# Patient Record
Sex: MALE | Race: WHITE | Employment: OTHER | URBAN - METROPOLITAN AREA
[De-identification: names, ages, dates, MRNs, and addresses within clinical notes are randomized per-mention and may not be internally consistent; named-entity substitution may affect disease eponyms.]

---

## 2017-01-01 ENCOUNTER — HOME CARE VISIT (OUTPATIENT)
Dept: HOSPICE | Facility: HOSPICE | Age: 77
End: 2017-01-01
Payer: MEDICARE

## 2017-01-01 ENCOUNTER — HOME CARE VISIT (OUTPATIENT)
Dept: SCHEDULING | Facility: HOME HEALTH | Age: 77
End: 2017-01-01
Payer: MEDICARE

## 2017-01-01 ENCOUNTER — HOSPICE ADMISSION (OUTPATIENT)
Dept: HOSPICE | Facility: HOSPICE | Age: 77
End: 2017-01-01
Payer: MEDICARE

## 2017-01-01 ENCOUNTER — APPOINTMENT (OUTPATIENT)
Dept: GENERAL RADIOLOGY | Age: 77
End: 2017-01-01
Attending: EMERGENCY MEDICINE
Payer: MEDICARE

## 2017-01-01 ENCOUNTER — HOSPITAL ENCOUNTER (OUTPATIENT)
Age: 77
Setting detail: OBSERVATION
Discharge: HOSPICE | End: 2017-02-25
Attending: EMERGENCY MEDICINE | Admitting: INTERNAL MEDICINE
Payer: MEDICARE

## 2017-01-01 ENCOUNTER — APPOINTMENT (OUTPATIENT)
Dept: CT IMAGING | Age: 77
End: 2017-01-01
Attending: EMERGENCY MEDICINE
Payer: MEDICARE

## 2017-01-01 VITALS — SYSTOLIC BLOOD PRESSURE: 96 MMHG | HEART RATE: 68 BPM | RESPIRATION RATE: 19 BRPM | DIASTOLIC BLOOD PRESSURE: 63 MMHG

## 2017-01-01 VITALS
TEMPERATURE: 97.6 F | HEART RATE: 70 BPM | DIASTOLIC BLOOD PRESSURE: 82 MMHG | HEIGHT: 68 IN | RESPIRATION RATE: 12 BRPM | SYSTOLIC BLOOD PRESSURE: 107 MMHG

## 2017-01-01 VITALS — HEART RATE: 72 BPM | RESPIRATION RATE: 20 BRPM | SYSTOLIC BLOOD PRESSURE: 102 MMHG | DIASTOLIC BLOOD PRESSURE: 70 MMHG

## 2017-01-01 VITALS
RESPIRATION RATE: 22 BRPM | DIASTOLIC BLOOD PRESSURE: 60 MMHG | SYSTOLIC BLOOD PRESSURE: 80 MMHG | HEART RATE: 70 BPM | OXYGEN SATURATION: 97 %

## 2017-01-01 VITALS — DIASTOLIC BLOOD PRESSURE: 52 MMHG | SYSTOLIC BLOOD PRESSURE: 96 MMHG | RESPIRATION RATE: 18 BRPM | HEART RATE: 70 BPM

## 2017-01-01 VITALS
HEIGHT: 68 IN | WEIGHT: 151.2 LBS | SYSTOLIC BLOOD PRESSURE: 104 MMHG | OXYGEN SATURATION: 97 % | DIASTOLIC BLOOD PRESSURE: 65 MMHG | TEMPERATURE: 98 F | BODY MASS INDEX: 22.91 KG/M2 | HEART RATE: 69 BPM | RESPIRATION RATE: 18 BRPM

## 2017-01-01 VITALS
HEART RATE: 72 BPM | SYSTOLIC BLOOD PRESSURE: 90 MMHG | OXYGEN SATURATION: 97 % | TEMPERATURE: 98.1 F | RESPIRATION RATE: 20 BRPM | SYSTOLIC BLOOD PRESSURE: 114 MMHG | OXYGEN SATURATION: 98 % | HEART RATE: 70 BPM | DIASTOLIC BLOOD PRESSURE: 60 MMHG | DIASTOLIC BLOOD PRESSURE: 70 MMHG | RESPIRATION RATE: 20 BRPM

## 2017-01-01 DIAGNOSIS — I47.20 VENTRICULAR TACHYCARDIA: ICD-10-CM

## 2017-01-01 DIAGNOSIS — I50.22 CHRONIC SYSTOLIC HEART FAILURE (HCC): ICD-10-CM

## 2017-01-01 DIAGNOSIS — I50.9 ACUTE ON CHRONIC CONGESTIVE HEART FAILURE, UNSPECIFIED CONGESTIVE HEART FAILURE TYPE: ICD-10-CM

## 2017-01-01 DIAGNOSIS — S05.12XA CONTUSION OF GLOBE OF LEFT EYE, INITIAL ENCOUNTER: ICD-10-CM

## 2017-01-01 DIAGNOSIS — R55 SYNCOPE AND COLLAPSE: Primary | ICD-10-CM

## 2017-01-01 LAB
ALBUMIN SERPL BCP-MCNC: 3.2 G/DL (ref 3.2–4.6)
ALBUMIN/GLOB SERPL: 0.7 {RATIO} (ref 1.2–3.5)
ALP SERPL-CCNC: 61 U/L (ref 50–136)
ALT SERPL-CCNC: 41 U/L (ref 12–65)
ANION GAP BLD CALC-SCNC: 7 MMOL/L (ref 7–16)
ANION GAP BLD CALC-SCNC: 8 MMOL/L (ref 7–16)
AST SERPL W P-5'-P-CCNC: 70 U/L (ref 15–37)
ATRIAL RATE: 70 BPM
BASOPHILS # BLD AUTO: 0 K/UL (ref 0–0.2)
BASOPHILS # BLD: 0 % (ref 0–2)
BILIRUB SERPL-MCNC: 1.2 MG/DL (ref 0.2–1.1)
BNP SERPL-MCNC: 3719 PG/ML
BUN SERPL-MCNC: 34 MG/DL (ref 8–23)
BUN SERPL-MCNC: 37 MG/DL (ref 8–23)
CALCIUM SERPL-MCNC: 8.7 MG/DL (ref 8.3–10.4)
CALCIUM SERPL-MCNC: 9.2 MG/DL (ref 8.3–10.4)
CALCULATED P AXIS, ECG09: NORMAL DEGREES
CALCULATED R AXIS, ECG10: -116 DEGREES
CALCULATED T AXIS, ECG11: 79 DEGREES
CHLORIDE SERPL-SCNC: 103 MMOL/L (ref 98–107)
CHLORIDE SERPL-SCNC: 105 MMOL/L (ref 98–107)
CHOLEST SERPL-MCNC: 117 MG/DL
CK SERPL-CCNC: 104 U/L (ref 21–215)
CO2 SERPL-SCNC: 29 MMOL/L (ref 21–32)
CO2 SERPL-SCNC: 31 MMOL/L (ref 21–32)
CREAT SERPL-MCNC: 1.7 MG/DL (ref 0.8–1.5)
CREAT SERPL-MCNC: 1.74 MG/DL (ref 0.8–1.5)
DIAGNOSIS, 93000: NORMAL
DIASTOLIC BP, ECG02: NORMAL MMHG
DIFFERENTIAL METHOD BLD: ABNORMAL
EOSINOPHIL # BLD: 0 K/UL (ref 0–0.8)
EOSINOPHIL NFR BLD: 1 % (ref 0.5–7.8)
ERYTHROCYTE [DISTWIDTH] IN BLOOD BY AUTOMATED COUNT: 17.7 % (ref 11.9–14.6)
GLOBULIN SER CALC-MCNC: 4.6 G/DL (ref 2.3–3.5)
GLUCOSE SERPL-MCNC: 113 MG/DL (ref 65–100)
GLUCOSE SERPL-MCNC: 135 MG/DL (ref 65–100)
HCT VFR BLD AUTO: 38.8 % (ref 41.1–50.3)
HDLC SERPL-MCNC: 29 MG/DL (ref 40–60)
HDLC SERPL: 4 {RATIO}
HGB BLD-MCNC: 13 G/DL (ref 13.6–17.2)
IMM GRANULOCYTES # BLD: 0 K/UL (ref 0–0.5)
IMM GRANULOCYTES NFR BLD AUTO: 0.3 % (ref 0–5)
INR PPP: 2.6 (ref 0.9–1.2)
LDLC SERPL CALC-MCNC: 67.4 MG/DL
LIPID PROFILE,FLP: ABNORMAL
LYMPHOCYTES # BLD AUTO: 18 % (ref 13–44)
LYMPHOCYTES # BLD: 1.1 K/UL (ref 0.5–4.6)
MAGNESIUM SERPL-MCNC: 2.2 MG/DL (ref 1.8–2.4)
MAGNESIUM SERPL-MCNC: 2.3 MG/DL (ref 1.8–2.4)
MCH RBC QN AUTO: 33.4 PG (ref 26.1–32.9)
MCHC RBC AUTO-ENTMCNC: 33.5 G/DL (ref 31.4–35)
MCV RBC AUTO: 99.7 FL (ref 79.6–97.8)
MONOCYTES # BLD: 0.6 K/UL (ref 0.1–1.3)
MONOCYTES NFR BLD AUTO: 10 % (ref 4–12)
NEUTS SEG # BLD: 4.5 K/UL (ref 1.7–8.2)
NEUTS SEG NFR BLD AUTO: 71 % (ref 43–78)
P-R INTERVAL, ECG05: 192 MS
PLATELET # BLD AUTO: 182 K/UL (ref 150–450)
PMV BLD AUTO: 12.3 FL (ref 10.8–14.1)
POTASSIUM SERPL-SCNC: 3.5 MMOL/L (ref 3.5–5.1)
POTASSIUM SERPL-SCNC: 5.3 MMOL/L (ref 3.5–5.1)
PROT SERPL-MCNC: 7.8 G/DL (ref 6.3–8.2)
PROTHROMBIN TIME: 28.1 SEC (ref 9.6–12)
Q-T INTERVAL, ECG07: 478 MS
QRS DURATION, ECG06: 178 MS
QTC CALCULATION (BEZET), ECG08: 516 MS
RBC # BLD AUTO: 3.89 M/UL (ref 4.23–5.67)
SODIUM SERPL-SCNC: 141 MMOL/L (ref 136–145)
SODIUM SERPL-SCNC: 142 MMOL/L (ref 136–145)
SYSTOLIC BP, ECG01: NORMAL MMHG
TRIGL SERPL-MCNC: 103 MG/DL (ref 35–150)
TROPONIN I SERPL-MCNC: 0.05 NG/ML (ref 0.02–0.05)
TROPONIN I SERPL-MCNC: 0.05 NG/ML (ref 0.02–0.05)
TSH SERPL DL<=0.005 MIU/L-ACNC: 12.3 UIU/ML (ref 0.36–3.74)
VENTRICULAR RATE, ECG03: 70 BPM
VLDLC SERPL CALC-MCNC: 20.6 MG/DL (ref 6–23)
WBC # BLD AUTO: 6.3 K/UL (ref 4.3–11.1)

## 2017-01-01 PROCEDURE — 74011250637 HC RX REV CODE- 250/637: Performed by: INTERNAL MEDICINE

## 2017-01-01 PROCEDURE — G0299 HHS/HOSPICE OF RN EA 15 MIN: HCPCS

## 2017-01-01 PROCEDURE — 99218 HC RM OBSERVATION: CPT

## 2017-01-01 PROCEDURE — HOSPICE MEDICATION HC HH HOSPICE MEDICATION

## 2017-01-01 PROCEDURE — 0651 HSPC ROUTINE HOME CARE

## 2017-01-01 PROCEDURE — 99285 EMERGENCY DEPT VISIT HI MDM: CPT | Performed by: EMERGENCY MEDICINE

## 2017-01-01 PROCEDURE — 74011250636 HC RX REV CODE- 250/636: Performed by: EMERGENCY MEDICINE

## 2017-01-01 PROCEDURE — 80053 COMPREHEN METABOLIC PANEL: CPT | Performed by: EMERGENCY MEDICINE

## 2017-01-01 PROCEDURE — 96366 THER/PROPH/DIAG IV INF ADDON: CPT

## 2017-01-01 PROCEDURE — 84484 ASSAY OF TROPONIN QUANT: CPT | Performed by: NURSE PRACTITIONER

## 2017-01-01 PROCEDURE — 85610 PROTHROMBIN TIME: CPT | Performed by: EMERGENCY MEDICINE

## 2017-01-01 PROCEDURE — 99343 PR HOME VST NEW PATIENT MOD-HI SEVERITY 45 MINUTES: CPT | Performed by: INTERNAL MEDICINE

## 2017-01-01 PROCEDURE — 74011250636 HC RX REV CODE- 250/636: Performed by: NURSE PRACTITIONER

## 2017-01-01 PROCEDURE — 74011250637 HC RX REV CODE- 250/637: Performed by: NURSE PRACTITIONER

## 2017-01-01 PROCEDURE — 96374 THER/PROPH/DIAG INJ IV PUSH: CPT | Performed by: EMERGENCY MEDICINE

## 2017-01-01 PROCEDURE — 36415 COLL VENOUS BLD VENIPUNCTURE: CPT | Performed by: NURSE PRACTITIONER

## 2017-01-01 PROCEDURE — 96361 HYDRATE IV INFUSION ADD-ON: CPT | Performed by: EMERGENCY MEDICINE

## 2017-01-01 PROCEDURE — 80061 LIPID PANEL: CPT | Performed by: NURSE PRACTITIONER

## 2017-01-01 PROCEDURE — 85025 COMPLETE CBC W/AUTO DIFF WBC: CPT | Performed by: EMERGENCY MEDICINE

## 2017-01-01 PROCEDURE — 93005 ELECTROCARDIOGRAM TRACING: CPT | Performed by: EMERGENCY MEDICINE

## 2017-01-01 PROCEDURE — 96376 TX/PRO/DX INJ SAME DRUG ADON: CPT

## 2017-01-01 PROCEDURE — 96375 TX/PRO/DX INJ NEW DRUG ADDON: CPT

## 2017-01-01 PROCEDURE — 96375 TX/PRO/DX INJ NEW DRUG ADDON: CPT | Performed by: EMERGENCY MEDICINE

## 2017-01-01 PROCEDURE — 74011000258 HC RX REV CODE- 258: Performed by: NURSE PRACTITIONER

## 2017-01-01 PROCEDURE — 84443 ASSAY THYROID STIM HORMONE: CPT | Performed by: NURSE PRACTITIONER

## 2017-01-01 PROCEDURE — 82550 ASSAY OF CK (CPK): CPT | Performed by: NURSE PRACTITIONER

## 2017-01-01 PROCEDURE — 80048 BASIC METABOLIC PNL TOTAL CA: CPT | Performed by: NURSE PRACTITIONER

## 2017-01-01 PROCEDURE — 83880 ASSAY OF NATRIURETIC PEPTIDE: CPT | Performed by: EMERGENCY MEDICINE

## 2017-01-01 PROCEDURE — 3336500001 HSPC ELECTION

## 2017-01-01 PROCEDURE — G0155 HHCP-SVS OF CSW,EA 15 MIN: HCPCS

## 2017-01-01 PROCEDURE — 70450 CT HEAD/BRAIN W/O DYE: CPT

## 2017-01-01 PROCEDURE — 84484 ASSAY OF TROPONIN QUANT: CPT | Performed by: EMERGENCY MEDICINE

## 2017-01-01 PROCEDURE — 71020 XR CHEST PA LAT: CPT

## 2017-01-01 PROCEDURE — 96365 THER/PROPH/DIAG IV INF INIT: CPT

## 2017-01-01 PROCEDURE — 74011250636 HC RX REV CODE- 250/636: Performed by: INTERNAL MEDICINE

## 2017-01-01 PROCEDURE — 83735 ASSAY OF MAGNESIUM: CPT | Performed by: NURSE PRACTITIONER

## 2017-01-01 PROCEDURE — 83735 ASSAY OF MAGNESIUM: CPT | Performed by: EMERGENCY MEDICINE

## 2017-01-01 RX ORDER — FUROSEMIDE 20 MG/1
20 TABLET ORAL EVERY EVENING
COMMUNITY
End: 2017-01-01 | Stop reason: DRUGHIGH

## 2017-01-01 RX ORDER — FUROSEMIDE 10 MG/ML
40 INJECTION INTRAMUSCULAR; INTRAVENOUS
Status: COMPLETED | OUTPATIENT
Start: 2017-01-01 | End: 2017-01-01

## 2017-01-01 RX ORDER — AMIODARONE HYDROCHLORIDE 200 MG/1
200 TABLET ORAL 2 TIMES DAILY
Qty: 60 TAB | Refills: 1 | Status: SHIPPED | OUTPATIENT
Start: 2017-01-01 | End: 2017-01-01 | Stop reason: DRUGHIGH

## 2017-01-01 RX ORDER — ASPIRIN 81 MG/1
81 TABLET ORAL DAILY
Status: DISCONTINUED | OUTPATIENT
Start: 2017-01-01 | End: 2017-01-01 | Stop reason: HOSPADM

## 2017-01-01 RX ORDER — AMIODARONE HYDROCHLORIDE 200 MG/1
400 TABLET ORAL 2 TIMES DAILY
Status: DISCONTINUED | OUTPATIENT
Start: 2017-01-01 | End: 2017-01-01

## 2017-01-01 RX ORDER — LEVOTHYROXINE SODIUM 75 UG/1
75 TABLET ORAL
Status: DISCONTINUED | OUTPATIENT
Start: 2017-01-01 | End: 2017-01-01 | Stop reason: HOSPADM

## 2017-01-01 RX ORDER — CARVEDILOL 3.12 MG/1
3.12 TABLET ORAL 2 TIMES DAILY WITH MEALS
Status: DISCONTINUED | OUTPATIENT
Start: 2017-01-01 | End: 2017-01-01

## 2017-01-01 RX ORDER — AMIODARONE HYDROCHLORIDE 400 MG/1
400 TABLET ORAL 2 TIMES DAILY
Qty: 14 TAB | Refills: 0 | Status: SHIPPED | OUTPATIENT
Start: 2017-01-01 | End: 2017-01-01 | Stop reason: DRUGHIGH

## 2017-01-01 RX ORDER — LEVOTHYROXINE SODIUM 75 UG/1
75 TABLET ORAL
COMMUNITY

## 2017-01-01 RX ORDER — WARFARIN 2.5 MG/1
1.25 TABLET ORAL
Status: DISCONTINUED | OUTPATIENT
Start: 2017-01-01 | End: 2017-01-01 | Stop reason: HOSPADM

## 2017-01-01 RX ORDER — NITROGLYCERIN 0.4 MG/1
0.4 TABLET SUBLINGUAL
Status: DISCONTINUED | OUTPATIENT
Start: 2017-01-01 | End: 2017-01-01 | Stop reason: HOSPADM

## 2017-01-01 RX ORDER — FUROSEMIDE 10 MG/ML
20 INJECTION INTRAMUSCULAR; INTRAVENOUS ONCE
Status: COMPLETED | OUTPATIENT
Start: 2017-01-01 | End: 2017-01-01

## 2017-01-01 RX ORDER — SODIUM CHLORIDE 0.9 % (FLUSH) 0.9 %
5-10 SYRINGE (ML) INJECTION EVERY 8 HOURS
Status: DISCONTINUED | OUTPATIENT
Start: 2017-01-01 | End: 2017-01-01 | Stop reason: HOSPADM

## 2017-01-01 RX ORDER — LANOLIN ALCOHOL/MO/W.PET/CERES
400 CREAM (GRAM) TOPICAL DAILY
Status: DISCONTINUED | OUTPATIENT
Start: 2017-01-01 | End: 2017-01-01 | Stop reason: HOSPADM

## 2017-01-01 RX ORDER — FUROSEMIDE 20 MG/1
20 TABLET ORAL DAILY
Status: DISCONTINUED | OUTPATIENT
Start: 2017-01-01 | End: 2017-01-01 | Stop reason: HOSPADM

## 2017-01-01 RX ORDER — MORPHINE SULFATE 2 MG/ML
2 INJECTION, SOLUTION INTRAMUSCULAR; INTRAVENOUS
Status: DISCONTINUED | OUTPATIENT
Start: 2017-01-01 | End: 2017-01-01 | Stop reason: HOSPADM

## 2017-01-01 RX ORDER — SODIUM CHLORIDE 0.9 % (FLUSH) 0.9 %
5-10 SYRINGE (ML) INJECTION AS NEEDED
Status: DISCONTINUED | OUTPATIENT
Start: 2017-01-01 | End: 2017-01-01 | Stop reason: HOSPADM

## 2017-01-01 RX ORDER — AMIODARONE HYDROCHLORIDE 200 MG/1
400 TABLET ORAL 2 TIMES DAILY
Status: DISCONTINUED | OUTPATIENT
Start: 2017-01-01 | End: 2017-01-01 | Stop reason: HOSPADM

## 2017-01-01 RX ORDER — FENOFIBRATE 160 MG/1
160 TABLET ORAL DAILY
Status: DISCONTINUED | OUTPATIENT
Start: 2017-01-01 | End: 2017-01-01

## 2017-01-01 RX ORDER — ATORVASTATIN CALCIUM 40 MG/1
40 TABLET, FILM COATED ORAL DAILY
Status: DISCONTINUED | OUTPATIENT
Start: 2017-01-01 | End: 2017-01-01

## 2017-01-01 RX ORDER — WARFARIN 2.5 MG/1
2.5 TABLET ORAL
Status: DISCONTINUED | OUTPATIENT
Start: 2017-01-01 | End: 2017-01-01 | Stop reason: HOSPADM

## 2017-01-01 RX ORDER — ONDANSETRON 2 MG/ML
4 INJECTION INTRAMUSCULAR; INTRAVENOUS
Status: DISCONTINUED | OUTPATIENT
Start: 2017-01-01 | End: 2017-01-01 | Stop reason: HOSPADM

## 2017-01-01 RX ORDER — ACETAMINOPHEN 325 MG/1
650 TABLET ORAL
Status: DISCONTINUED | OUTPATIENT
Start: 2017-01-01 | End: 2017-01-01 | Stop reason: HOSPADM

## 2017-01-01 RX ADMIN — AMIODARONE HYDROCHLORIDE 400 MG: 200 TABLET ORAL at 17:21

## 2017-01-01 RX ADMIN — AMIODARONE HYDROCHLORIDE 400 MG: 200 TABLET ORAL at 09:12

## 2017-01-01 RX ADMIN — Medication 10 ML: at 14:00

## 2017-01-01 RX ADMIN — WARFARIN SODIUM 2.5 MG: 2.5 TABLET ORAL at 21:37

## 2017-01-01 RX ADMIN — AMIODARONE HYDROCHLORIDE 400 MG: 200 TABLET ORAL at 17:10

## 2017-01-01 RX ADMIN — LEVOTHYROXINE SODIUM 75 MCG: 75 TABLET ORAL at 05:19

## 2017-01-01 RX ADMIN — ONDANSETRON 4 MG: 2 INJECTION INTRAMUSCULAR; INTRAVENOUS at 00:12

## 2017-01-01 RX ADMIN — ACETAMINOPHEN 650 MG: 325 TABLET, FILM COATED ORAL at 03:17

## 2017-01-01 RX ADMIN — Medication 10 ML: at 05:07

## 2017-01-01 RX ADMIN — ATORVASTATIN CALCIUM 40 MG: 40 TABLET, FILM COATED ORAL at 09:06

## 2017-01-01 RX ADMIN — FUROSEMIDE 20 MG: 20 TABLET ORAL at 09:01

## 2017-01-01 RX ADMIN — Medication 10 ML: at 23:48

## 2017-01-01 RX ADMIN — FUROSEMIDE 40 MG: 10 INJECTION, SOLUTION INTRAMUSCULAR; INTRAVENOUS at 21:39

## 2017-01-01 RX ADMIN — FENOFIBRATE 160 MG: 160 TABLET ORAL at 09:31

## 2017-01-01 RX ADMIN — Medication 400 MG: at 09:02

## 2017-01-01 RX ADMIN — FENOFIBRATE 160 MG: 160 TABLET ORAL at 09:06

## 2017-01-01 RX ADMIN — Medication 5 ML: at 05:25

## 2017-01-01 RX ADMIN — LEVOTHYROXINE SODIUM 75 MCG: 75 TABLET ORAL at 05:06

## 2017-01-01 RX ADMIN — SODIUM CHLORIDE 500 ML: 900 INJECTION, SOLUTION INTRAVENOUS at 20:11

## 2017-01-01 RX ADMIN — Medication 400 MG: at 09:06

## 2017-01-01 RX ADMIN — AMIODARONE HYDROCHLORIDE 150 MG: 50 INJECTION, SOLUTION INTRAVENOUS at 23:07

## 2017-01-01 RX ADMIN — AMIODARONE HYDROCHLORIDE 400 MG: 200 TABLET ORAL at 09:02

## 2017-01-01 RX ADMIN — ASPIRIN 81 MG: 81 TABLET, COATED ORAL at 09:01

## 2017-01-01 RX ADMIN — AMIODARONE HYDROCHLORIDE 400 MG: 200 TABLET ORAL at 23:47

## 2017-01-01 RX ADMIN — ASPIRIN 81 MG: 81 TABLET, COATED ORAL at 09:07

## 2017-01-01 RX ADMIN — CARVEDILOL 3.12 MG: 3.12 TABLET, FILM COATED ORAL at 09:12

## 2017-01-01 RX ADMIN — ONDANSETRON 4 MG: 2 INJECTION INTRAMUSCULAR; INTRAVENOUS at 00:00

## 2017-01-01 RX ADMIN — AMIODARONE HYDROCHLORIDE 0.5 MG/MIN: 50 INJECTION, SOLUTION INTRAVENOUS at 12:03

## 2017-01-01 RX ADMIN — WARFARIN SODIUM 1.25 MG: 2.5 TABLET ORAL at 20:46

## 2017-01-01 RX ADMIN — FUROSEMIDE 20 MG: 10 INJECTION, SOLUTION INTRAMUSCULAR; INTRAVENOUS at 12:49

## 2017-01-01 RX ADMIN — AMIODARONE HYDROCHLORIDE 1 MG/MIN: 50 INJECTION, SOLUTION INTRAVENOUS at 23:16

## 2017-01-01 RX ADMIN — Medication 5 ML: at 05:06

## 2017-01-01 RX ADMIN — ASPIRIN 81 MG: 81 TABLET, COATED ORAL at 09:31

## 2017-01-01 RX ADMIN — Medication 10 ML: at 20:46

## 2017-01-01 RX ADMIN — Medication 10 ML: at 21:37

## 2017-01-01 RX ADMIN — ONDANSETRON 4 MG: 2 INJECTION INTRAMUSCULAR; INTRAVENOUS at 13:14

## 2017-01-01 RX ADMIN — ATORVASTATIN CALCIUM 40 MG: 40 TABLET, FILM COATED ORAL at 09:31

## 2017-01-01 RX ADMIN — ACETAMINOPHEN 650 MG: 325 TABLET, FILM COATED ORAL at 09:15

## 2017-01-01 RX ADMIN — Medication 400 MG: at 09:31

## 2017-01-30 PROBLEM — E03.9 ACQUIRED HYPOTHYROIDISM: Status: ACTIVE | Noted: 2017-01-01

## 2017-01-30 PROBLEM — E78.2 MIXED HYPERLIPIDEMIA: Status: ACTIVE | Noted: 2017-01-01

## 2017-02-22 NOTE — IP AVS SNAPSHOT
[facility] [place] 
 
 
 [address] [facility] [address] 
[phone] Patient: Neelima Childs MRN: [id number] You are allergic to the following Allergen Reactions Spironolactone Unknown (comments) Recent Documentation Height  
  
  
  
  
  
 1.727 m Emergency Contacts Name Discharge Info Relation Home Work Mobile Catie Cook  Child [2] [phone] About your hospitalization You were admitted on:  2017 You last received care in the:  [facility] 3 CLINICAL OBSERVATION You were discharged on:  2017 Unit phone number:  [phone] Why you were hospitalized Your primary diagnosis was:  Ventricular Tachycardia (Hcc) Your diagnoses also included:  Type 2 Diabetes Mellitus With Diabetic Chronic Kidney Disease (Hcc), Mixed Hyperlipidemia, Chronic Systolic Heart Failure (Hcc), Chronic Kidney Disease, Stage Iii (Moderate), Acquired Hypothyroidism Providers Seen During Your Hospitalizations Provider Role Specialty Primary office phone Bolivar Olvera MD Attending Provider Emergency Medicine [phone] Lamberto Hernandez MD Attending Provider Cardiology [phone] Your Primary Care Physician (PCP) Primary Care Physician Office Phone Office Fax Hao Ho [phone] Follow-up Information Follow up With Details Comments Contact Info Jany Rowell MD  as needed [address] [place] [address] 
[phone] Your Appointments   9:15 AM EST ANTICOAG with Malou Gonzalez PT [facility] ([address] [place] [address]) [address] 
[address] [place] [address] [place] [address]  
[phone] 2017 10:45 AM EDT Office Visit with Jany Rowell MD  
[address] [employer] ([address] [facility]) [address] [place] [address]  
[phone] Tuesday April 11, 2017  2:15 PM EDT Annual Wellness Exam with Gonzalo Araujo MD  
[facility] ([address]) [address]   
[phone] Current Discharge Medication List  
  
CONTINUE these medications which have CHANGED Dose & Instructions Dispensing Information Comments Morning Noon Evening Bedtime * amiodarone 400 mg tablet Commonly known as:  Lorren Lightcollins What changed:   
- medication strength 
- how much to take 
- how to take this - when to take this 
- additional instructions Your next dose is: Today, Tomorrow Other:  _________ Dose:  400 mg Take 1 Tab by mouth two (2) times a day for 7 days. Quantity:  14 Tab Refills:  0  
     
   
   
   
  
 * amiodarone 200 mg tablet Commonly known as:  CORDARONE What changed: You were already taking a medication with the same name, and this prescription was added. Make sure you understand how and when to take each. Your next dose is: Today, Tomorrow Other:  _________ Dose:  200 mg Take 1 Tab by mouth two (2) times a day. Quantity:  60 Tab Refills:  1  
     
   
   
   
  
 * Notice: This list has 2 medication(s) that are the same as other medications prescribed for you. Read the directions carefully, and ask your doctor or other care provider to review them with you. CONTINUE these medications which have NOT CHANGED Dose & Instructions Dispensing Information Comments Morning Noon Evening Bedtime  
 aspirin delayed-release 81 mg tablet Your next dose is: Today, Tomorrow Other:  _________ Take  by mouth daily. Refills:  0  
     
   
   
   
  
 BESIVANCE 0.6 % Drps ophthalmic suspension Generic drug:  besifloxacin Your next dose is: Today, Tomorrow Other:  _________ Dose:  1 Drop 1 Drop three (3) times daily. Refills:  0  
     
   
   
   
  
 COUMADIN 2.5 mg tablet Generic drug:  warfarin Your next dose is: Today, Tomorrow Other:  _________ Dose:  2.5 mg Take 2.5 mg by mouth daily. Refills:  0  
     
   
   
   
  
 LASIX 20 mg tablet Generic drug:  furosemide Your next dose is: Today, Tomorrow Other:  _________ Dose:  20 mg Take 20 mg by mouth every evening. Refills:  0  
     
   
   
   
  
 levothyroxine 75 mcg tablet Commonly known as:  SYNTHROID Your next dose is: Today, Tomorrow Other:  _________ Dose:  75 mcg Take 75 mcg by mouth Daily (before breakfast). Refills:  0  
     
   
   
   
  
 magnesium oxide 400 mg tablet Commonly known as:  MAG-OX Your next dose is: Today, Tomorrow Other:  _________ Dose:  400 mg Take 1 Tab by mouth daily. Quantity:  90 Tab Refills:  1 STOP taking these medications   
 atorvastatin 40 mg tablet Commonly known as:  LIPITOR  
   
  
 carvedilol 3.125 mg tablet Commonly known as:  COREG  
   
  
 digoxin 0.125 mg tablet Commonly known as:  LANOXIN  
   
  
 fenofibrate 160 mg tablet Commonly known as:  LOFIBRA  
   
  
 flaxseed oil 1,000 mg Cap Where to Get Your Medications Information on where to get these meds will be given to you by the nurse or doctor. ! Ask your nurse or doctor about these medications  
  amiodarone 200 mg tablet  
 amiodarone 400 mg tablet Discharge Instructions DISCHARGE SUMMARY from Nurse The following personal items are in your possession at time of discharge: 
 
Dental Appliances: Uppers, With patient, Lowers Visual Aid: Glasses, With patient Home Medications: None Jewelry: With patient, Watch Clothing: Footwear, Undergarments, With patient, Gibson Jonelle Other Valuables: Eyeglasses, With patient PATIENT INSTRUCTIONS: 
 
 
F-face looks uneven A-arms unable to move or move unevenly S-speech slurred or non-existent T-time-call 911 as soon as signs and symptoms begin-DO NOT go Back to bed or wait to see if you get better-TIME IS BRAIN. Warning Signs of HEART ATTACK Call 911 if you have these symptoms: 
? Chest discomfort. Most heart attacks involve discomfort in the center of the chest that lasts more than a few minutes, or that goes away and comes back. It can feel like uncomfortable pressure, squeezing, fullness, or pain. ? Discomfort in other areas of the upper body. Symptoms can include pain or discomfort in one or both arms, the back, neck, jaw, or stomach. ? Shortness of breath with or without chest discomfort. ? Other signs may include breaking out in a cold sweat, nausea, or lightheadedness. Don't wait more than five minutes to call [address]! Fast action can save your life. Calling 911 is almost always the fastest way to get lifesaving treatment. Emergency Medical Services staff can begin treatment when they arrive  up to an hour sooner than if someone gets to the hospital by car. The discharge information has been reviewed with the patient. The patient verbalized understanding. Discharge medications reviewed with the patient and appropriate educational materials and side effects teaching were provided. Heart Failure: Care Instructions Your Care Instructions Heart failure occurs when your heart does not pump as much blood as the body needs. Failure does not mean that the heart has stopped pumping but rather that it is not pumping as well as it should.  Over time, this causes fluid buildup in your lungs and other parts of your body. Fluid buildup can cause shortness of breath, fatigue, swollen ankles, and other problems. By taking medicines regularly, reducing sodium (salt) in your diet, checking your weight every day, and making lifestyle changes, you can feel better and live longer. Follow-up care is a key part of your treatment and safety. Be sure to make and go to all appointments, and call your doctor if you are having problems. It's also a good idea to know your test results and keep a list of the medicines you take. How can you care for yourself at home? Medicines · Be safe with medicines. Take your medicines exactly as prescribed. Call your doctor if you think you are having a problem with your medicine. · Do not take any vitamins, over-the-counter medicine, or herbal products without talking to your doctor first. Alisa Adams not take ibuprofen (Advil or Motrin) and naproxen (Aleve) without talking to your doctor first. They could make your heart failure worse. · You may be taking some of the following medicine. ¨ Beta-blockers can slow heart rate, decrease blood pressure, and improve your condition. Taking a beta-blocker may lower your chance of needing to be hospitalized. ¨ Angiotensin-converting enzyme inhibitors (ACEIs) reduce the heart's workload, lower blood pressure, and reduce swelling. Taking an ACEI may lower your chance of needing to be hospitalized again. ¨ Angiotensin II receptor blockers (ARBs) work like ACEIs. Your doctor may prescribe them instead of ACEIs. ¨ Diuretics, also called water pills, reduce swelling. ¨ Potassium supplements replace this important mineral, which is sometimes lost with diuretics. ¨ Aspirin and other blood thinners prevent blood clots, which can cause a stroke or heart attack. You will get more details on the specific medicines your doctor prescribes. Diet · Your doctor may suggest that you limit sodium to 2,000 milligrams (mg) a day or less. That is less than 1 teaspoon of salt a day, including all the salt you eat in cooking or in packaged foods. People get most of their sodium from processed foods. Fast food and restaurant meals also tend to be very high in sodium. · Ask your doctor how much liquid you can drink each day. You may have to limit liquids. Weight · Weigh yourself without clothing at the same time each day. Record your weight. Call your doctor if you gain more than 3 pounds in 2 to 3 days. A sudden weight gain may mean that your heart failure is getting worse. Activity level · Start light exercise (if your doctor says it is okay). Even if you can only do a small amount, exercise will help you get stronger, have more energy, and manage your weight and your stress. Walking is an easy way to get exercise. Start out by walking a little more than you did before. Bit by bit, increase the amount you walk. · When you exercise, watch for signs that your heart is working too hard. You are pushing yourself too hard if you cannot talk while you are exercising. If you become short of breath or dizzy or have chest pain, stop, sit down, and rest. 
· If you feel \"wiped out\" the day after you exercise, walk slower or for a shorter distance until you can work up to a better pace. · Get enough rest at night. Sleeping with 1 or 2 pillows under your upper body and head may help you breathe easier. Lifestyle changes · Do not smoke. Smoking can make a heart condition worse. If you need help quitting, talk to your doctor about stop-smoking programs and medicines. These can increase your chances of quitting for good. Quitting smoking may be the most important step you can take to protect your heart. · Limit alcohol to 2 drinks a day for men and 1 drink a day for women. Too much alcohol can cause health problems. · Avoid getting sick from colds and the flu.  Get a pneumococcal vaccine shot. If you have had one before, ask your doctor whether you need another dose. Get a flu shot each year. If you must be around people with colds or the flu, wash your hands often. When should you call for help? Call 911 if you have symptoms of sudden heart failure such as: 
· You have severe trouble breathing. · You cough up pink, foamy mucus. · You have a new irregular or rapid heartbeat. Call your doctor now or seek immediate medical care if: 
· You have new or increased shortness of breath. · You are dizzy or lightheaded, or you feel like you may faint. · You have sudden weight gain, such as 3 pounds or more in 2 to 3 days. · You have increased swelling in your legs, ankles, or feet. · You are suddenly so tired or weak that you cannot do your usual activities. Watch closely for changes in your health, and be sure to contact your doctor if: 
· You develop new symptoms. Where can you learn more? Go to http://nikolas-my.info/. Enter W508 in the search box to learn more about \"Heart Failure: Care Instructions. \" Current as of: January 27, 2016 Content Version: 11.1 © 2186-6949 [employer]. Care instructions adapted under license by [employer] (which disclaims liability or warranty for this information). If you have questions about a medical condition or this instruction, always ask your healthcare professional. [place] [address] any warranty or liability for your use of this information. Discharge Orders None [facility] Announcement We are excited to announce that we are making your provider's discharge notes available to you in P[employer]. You will see these notes when they are completed and signed by the physician that discharged you from your recent hospital stay.   If you have any questions or concerns about any information you see in PingTank, please call the [employer] Department where you were seen or reach out to your Primary Care Provider for more information about your plan of care. Introducing [facility]! Dear Lorraine Monroy: Thank you for requesting a [employer] account. Our records indicate that you already have an active [employer] account. You can access your account anytime at [email or web address] Did you know that you can access your hospital and ER discharge instructions at any time in [employer]? You can also review all of your test results from your hospital stay or ER visit. Additional Information If you have questions, please visit the Frequently Asked Questions section of the [employer] website at [email or web address] Remember, [employer] is NOT to be used for urgent needs. For medical emergencies, dial 911. Now available from your iPhone and Android! General Information Please provide this summary of care documentation to your next provider. Patient Signature:  ____________________________________________________________ Date:  ____________________________________________________________  
  
Gabriela Hendrickson Provider Signature:  ____________________________________________________________ Date:  ____________________________________________________________

## 2017-02-23 NOTE — PROGRESS NOTES
Skin assessment completed with secondary RN. Patient has scab/abrasion on right side of neck. Pt unaware of how he got this. Left eye swollen and ecchymotic from recent fall at home. Middle of forehead has swollen bump that is ecchymotic from fall. Bridge of nose is scabbed. BUE alina with scattered abrasions and ecchymosis. Sacrum and heels visualized and intact. No breakdown noted.

## 2017-02-23 NOTE — PROGRESS NOTES
Bedside and Verbal shift change report given to Shanelle Monterroso RN (oncoming nurse) by self Monserrat Bunch nurse). Report included the following information SBAR, Kardex, MAR and Recent Results.

## 2017-02-23 NOTE — HOSPICE
Met with daughter and patient in room to discuss hospice services, levels of care, philosophy, insurance and type of care. Patient and daughter with many questions. Patient teary and stated \"does this mean I have less than 6 months and I just need to give up, someone came in and told me to pick a date to have my pacer turned off, will this be the day I die? \" Comfort measures, quality of life and change in direction of care and what we hope for explained. Discussed what care would look like in the home. Daughter and patient with appropriate questions. Asked for card and brochures.  Thank you for this referral.  CARLITO Moyer raysa Nurse Liaison  [facility]  [phone]

## 2017-02-23 NOTE — ED NOTES
TRANSFER - OUT REPORT:    Verbal report given to CARLITO Valle(name) on Carmen Grider  being transferred to [facility](unit) for routine progression of care       Report consisted of patients Situation, Background, Assessment and   Recommendations(SBAR). Information from the following report(s) SBAR, ED Summary, [facility] and Recent Results was reviewed with the receiving nurse. Lines:   Peripheral IV 02/22/17 Right Arm (Active)        Opportunity for questions and clarification was provided.       Patient transported with:   Monitor  Registered Nurse

## 2017-02-23 NOTE — PROGRESS NOTES
TRANSFER - IN REPORT:    Verbal report received from Gumaro Cho RN (name) on Hernandez Blas  being received from ED (unit) for routine progression of care      Report consisted of patients Situation, Background, Assessment and   Recommendations(SBAR). Information from the following report(s) SBAR, Kardex and ED Summary was reviewed with the receiving nurse. Opportunity for questions and clarification was provided. Assessment completed upon patients arrival to unit and care assumed. Telemetry monitor applied. VS taken. Red gripper socks applied. Pt denies any pain or SOB. Bed low and locked. Side rails x 2. Call light within reach. Pt verbalizes understanding to call for assistance.

## 2017-02-23 NOTE — PROGRESS NOTES
[facility] CARDIOLOGY PROGRESS NOTE           2/23/2017 12:36 PM    Admit Date: 2/22/2017    Admit Diagnosis: Ventricular tachycardia (HCC)      Subjective:   No complaints this AM, no chest pain or shortness of breath    Interval History: (History of pertinent interval events obtained from nursing staff)  Palliative care consult came by this AM    ROS:  GEN:  No fever or chills  Cardiovascular:  As noted above  Pulmonary:  As noted above  Neuro:  No new focal motor or sensory loss      Objective:     Vitals:    02/23/17 0421 02/23/17 0630 02/23/17 0844 02/23/17 0909   BP:  91/52 (!) 84/51 100/57   Pulse:  72 70    Resp:  18 17    Temp:  97.4 °F (36.3 °C) 97.4 °F (36.3 °C)    SpO2:  97% 98%    Weight: 67.6 kg (149 lb)      Height:           Physical Exam:  General-chronically ill appearing  Neck- supple, no JVD  CV- regular rate, paced, distant S1, S2, II/VI SM  Lung- clear bilaterally  Abd- soft, nontender, nondistended  Ext- no edema bilaterally.   Skin- warm and dry    Current Facility-Administered Medications   Medication Dose Route Frequency    amiodarone (CORDARONE) tablet 400 mg  400 mg Oral BID    aspirin delayed-release tablet 81 mg  81 mg Oral DAILY    atorvastatin (LIPITOR) tablet 40 mg  40 mg Oral DAILY    carvedilol (COREG) tablet 3.125 mg  3.125 mg Oral BID WITH MEALS    fenofibrate (LOFIBRA) tablet 160 mg  160 mg Oral DAILY    levothyroxine (SYNTHROID) tablet 75 mcg  75 mcg Oral ACB    warfarin (COUMADIN) tablet 2.5 mg  2.5 mg Oral Once per day on Sun Tue Thu Sat    magnesium oxide (MAG-OX) tablet 400 mg  400 mg Oral DAILY    sodium chloride (NS) flush 5-10 mL  5-10 mL IntraVENous Q8H    sodium chloride (NS) flush 5-10 mL  5-10 mL IntraVENous PRN    nitroglycerin (NITROSTAT) tablet 0.4 mg  0.4 mg SubLINGual Q5MIN PRN    morphine injection 2 mg  2 mg IntraVENous Q4H PRN    acetaminophen (TYLENOL) tablet 650 mg  650 mg Oral Q4H PRN    ondansetron (ZOFRAN) injection 4 mg 4 mg IntraVENous Q4H PRN    [START ON 2/24/2017] warfarin (COUMADIN) tablet 1.25 mg  1.25 mg Oral Once per day on Mon Wed Fri     Data Review:   Recent Results (from the past 24 hour(s))   EKG, 12 LEAD, INITIAL    Collection Time: 02/22/17  5:56 PM   Result Value Ref Range    Systolic BP  mmHg    Diastolic BP  mmHg    Ventricular Rate 70 BPM    Atrial Rate 70 BPM    P-R Interval 192 ms    QRS Duration 178 ms    Q-T Interval 478 ms    QTC Calculation (Bezet) 516 ms    Calculated P Axis  degrees    Calculated R Axis -116 degrees    Calculated T Axis 79 degrees    Diagnosis       AV sequential or dual chamber electronic pacemaker  Confirmed by Haider Dunn ([id number]) on 2/23/2017 7:02:15 AM     CBC WITH AUTOMATED DIFF    Collection Time: 02/22/17  6:05 PM   Result Value Ref Range    WBC 6.3 4.3 - 11.1 K/uL    RBC 3.89 (L) 4.23 - 5.67 M/uL    HGB 13.0 (L) 13.6 - 17.2 g/dL    HCT 38.8 (L) 41.1 - 50.3 %    MCV 99.7 (H) 79.6 - 97.8 FL    MCH 33.4 (H) 26.1 - 32.9 PG    MCHC 33.5 31.4 - 35.0 g/dL    RDW 17.7 (H) 11.9 - 14.6 %    PLATELET 727 901 - 799 K/uL    MPV 12.3 10.8 - 14.1 FL    DF AUTOMATED      NEUTROPHILS 71 43 - 78 %    LYMPHOCYTES 18 13 - 44 %    MONOCYTES 10 4.0 - 12.0 %    EOSINOPHILS 1 0.5 - 7.8 %    BASOPHILS 0 0.0 - 2.0 %    IMMATURE GRANULOCYTES 0.3 0.0 - 5.0 %    ABS. NEUTROPHILS 4.5 1.7 - 8.2 K/UL    ABS. LYMPHOCYTES 1.1 0.5 - 4.6 K/UL    ABS. MONOCYTES 0.6 0.1 - 1.3 K/UL    ABS. EOSINOPHILS 0.0 0.0 - 0.8 K/UL    ABS. BASOPHILS 0.0 0.0 - 0.2 K/UL    ABS. IMM.  GRANS. 0.0 0.0 - 0.5 K/UL   METABOLIC PANEL, COMPREHENSIVE    Collection Time: 02/22/17  6:05 PM   Result Value Ref Range    Sodium 142 136 - 145 mmol/L    Potassium 3.5 3.5 - 5.1 mmol/L    Chloride 103 98 - 107 mmol/L    CO2 31 21 - 32 mmol/L    Anion gap 8 7 - 16 mmol/L    Glucose 113 (H) 65 - 100 mg/dL    BUN 34 (H) 8 - 23 MG/DL    Creatinine 1.70 (H) 0.8 - 1.5 MG/DL    GFR est AA 51 (L) >60 ml/min/1.73m2    GFR est non-AA 42 (L) >60 ml/min/1.73m2 Calcium 9.2 8.3 - 10.4 MG/DL    Bilirubin, total 1.2 (H) 0.2 - 1.1 MG/DL    ALT (SGPT) 41 12 - 65 U/L    AST (SGOT) 70 (H) 15 - 37 U/L    Alk. phosphatase 61 50 - 136 U/L    Protein, total 7.8 6.3 - 8.2 g/dL    Albumin 3.2 3.2 - 4.6 g/dL    Globulin 4.6 (H) 2.3 - 3.5 g/dL    A-G Ratio 0.7 (L) 1.2 - 3.5     MAGNESIUM    Collection Time: 02/22/17  6:05 PM   Result Value Ref Range    Magnesium 2.2 1.8 - 2.4 mg/dL   TROPONIN I    Collection Time: 02/22/17  6:05 PM   Result Value Ref Range    Troponin-I, Qt. 0.05 0.02 - 0.05 NG/ML   BNP    Collection Time: 02/22/17  6:43 PM   Result Value Ref Range    BNP 3719 pg/mL   TSH 3RD GENERATION    Collection Time: 02/22/17  6:43 PM   Result Value Ref Range    TSH 12.300 (H) 0.358 - 3.740 uIU/mL   PROTHROMBIN TIME + INR    Collection Time: 02/22/17  8:08 PM   Result Value Ref Range    Prothrombin time 28.1 (H) 9.6 - 12.0 sec    INR 2.6 (H) 0.9 - 1.2     CK    Collection Time: 02/23/17 12:21 AM   Result Value Ref Range     21 - 215 U/L   TROPONIN I    Collection Time: 02/23/17 12:21 AM   Result Value Ref Range    Troponin-I, Qt. 0.05 0.02 - 0.05 NG/ML   LIPID PANEL    Collection Time: 02/23/17  4:30 AM   Result Value Ref Range    LIPID PROFILE          Cholesterol, total 117 <200 MG/DL    Triglyceride 103 35 - 150 MG/DL    HDL Cholesterol 29 (L) 40 - 60 MG/DL    LDL, calculated 67.4 <100 MG/DL    VLDL, calculated 20.6 6.0 - 23.0 MG/DL    CHOL/HDL Ratio 4.0         EKG:  (EKG has been independently visualized by me with interpretation below)  Assessment:     Principal Problem:    Ventricular tachycardia ([address] (3/7/2016)      Overview: Amiodarone.           Active Problems:    Type 2 diabetes mellitus with diabetic chronic kidney disease ([facility] (6/12/2011)      Chronic kidney disease, stage III (moderate) (6/12/2011)      Overview: Baseline Cr 1.6      Chronic systolic heart failure ([facility] (6/13/2011)      Overview: Ischemic cardiomyopathy, EF 22% by MUGA 10/08, had repeatedly declined       device therapy, but agreed in Jan 2013 Remote CABG in [place]. Intolerant of aldosterone blockade due to hyperkalemia. Unable to titrate       beta blocker and ACEi due to hypotension. S ER 12/15/12: IV lasix for dyspnea with CHF exacerbation, BNP 3030, CXR       remarkably clear. 1/3/13: Severe 4 chamber dilatation, EF 10-15%, moderate to severe MR,       moderate TR, RVSP 48, probable LV thrombus (anticoagulated)      Jan 2013: Severe LV Dilatation, EF 10-15%, restrictivve diastolic       fillling, RVSP 48      Sep 2013: Echo - severely dilated LV. LVEF 10-15%, mild MR and TR, RVSP 25      Sep 2014: Echo - LVEF estimated 5-10% (calculated 22% felt to be       optimistic), moderate MR and TR, RVSP 32, thinning and akinesis of the       inferior wall and apex      Oct 2015: EF 13%, mild MR and TR, RVSP 27      Jan 2017 - severe LVE, EF 10-15%, only the mid anterior wall moves, mild       to mod MR, RVSP 40      Acquired hypothyroidism (1/30/2017)      Mixed hyperlipidemia (1/30/2017)      Plan:   1. VT: increased amiodarone 400mg Q12H, decreased VT-1 zone to incorporate slower VT  2. End stage CHF: palliative care consult placed, discussed goals of care with patient today and daughter, his main concern is he wants to go home with home oxygen (apparently discussed with him by palliative care team), cont current meds  3. CKD: stable, around his baseline  4. DNR    Criss Aguero MD  Cardiology/Electrophysiology

## 2017-02-23 NOTE — PROGRESS NOTES
Bedside shift change report received from [facility]. Report included the following information SBAR, Kardex, MAR and Recent Results.

## 2017-02-23 NOTE — ED PROVIDER NOTES
HPI Comments: Patient is a 69-year-old male who is coming in after having to be episode. He was lying in bed and felt dizzy raise. In the next thing he knew he was on the floor. He does not clearly remember the episode. He did hit the left side of his head. And he does take Coumadin. He currently states he feels good except for mild throbbing head. He has no chest pain or current shortness of breath although he does get some occasionally when he is walking or lying down. Patient is a 67 y.o. male presenting with syncope. The history is provided by the patient. Syncope    Pertinent negatives include no chest pain, no palpitations, no fever, no abdominal pain, no nausea and no vomiting.         Past Medical History:   Diagnosis Date    Acute renal insufficiency 6/12/2011    AICD (automatic cardioverter/defibrillator) present 3/7/2016    1/9/13: Biotronik biventricular ICD, Dr. Sabina Hoover 6/13/2011    CAD (coronary artery disease)     CHF, EF 10-15% in 1/13, had CABG surgery in [place] years ago    Cardiomyopathy - EF 10-15% 6/13/2011    Chronic kidney disease, stage III (moderate) 6/12/2011    Baseline Cr 1.6     Chronic obstructive pulmonary disease (COPD) (HCC)     Chronic systolic heart failure (HCC)     Ischemic cardiomyopathy, 1/13 EF 10-15%, mod-severe MR, mod TR, RVSP 48    CKD (chronic kidney disease) 6/12/2011    Congestive heart failure, unspecified ([address] 6/13/2013    Diabetes mellitus, type 2 ([address] 6/12/2011    Encounter for therapeutic drug monitoring 6/13/2013    Essential hypertension, benign 6/13/2013    Former heavy cigarette smoker (20-39 per day)     Hyperlipidemia 9/2/2015    Impotence of organic origin 6/13/2013    Paroxysmal ventricular tachycardia ([address]     Skin lesion of face 9/2/2015    Stroke [facility])     TIA (transient ischemic attack) 6/12/2011    Unspecified hypothyroidism 6/13/2013    Unspecified transient cerebral ischemia 11/11    carotids less than 50% stenosis bilaterally    Unspecified vitamin D deficiency 6/13/2013    Ventricular tachycardia ([address] 3/7/2016    Amiodarone. 2/15/13: TSH4 4.4, T4 14.6 normal LFTs. Labs are done every 3 months by PCP. Sep 2014: TSH 5.9, T4 1.5, LFT's normal Dec 2014: normal thyroid, AST 43, ALT normal        Past Surgical History:   Procedure Laterality Date    HX BIV-IMPLANTABLE CARDIOVERTER DEFIBRILLATOR  1/8/2013    Biotronik BiV ICD    HX CATARACT REMOVAL Left 12/21/2016    [facility]    HX CATARACT REMOVAL Right 12/28/2016    [facility] CORONARY ARTERY BYPASS GRAFT  2013    CABG    HX HERNIA REPAIR  6/08    Inguinal hernia repair-right         Family History:   Problem Relation Age of Onset    Coronary Artery Disease Father      MI at 59 yo    Heart Disease Father 58     MI    Cancer Brother      benign brain tumor; resected approx 2007    Other Neg Hx      prostate cancer       Social History     Social History    Marital status: SINGLE     Spouse name: N/A    Number of children: N/A    Years of education: N/A     Occupational History    Not on file. Social History Main Topics    Smoking status: Former Smoker     Packs/day: 1.00     Years: 40.00     Quit date: 1/1/2000    Smokeless tobacco: Never Used    Alcohol use Yes      Comment: rare    Drug use: No    Sexual activity: Not on file     Other Topics Concern    Not on file     Social History Narrative         ALLERGIES: Spironolactone    Review of Systems   Constitutional: Negative for chills and fever. Respiratory: Negative for chest tightness, shortness of breath, wheezing and stridor. Cardiovascular: Positive for syncope. Negative for chest pain and palpitations. Gastrointestinal: Negative for abdominal pain, diarrhea, nausea and vomiting. Skin: Negative. All other systems reviewed and are negative.       Vitals:    02/22/17 1752 02/22/17 1920 02/22/17 1940   BP: 94/65 105/62 106/73   Pulse: 70 69    Resp: 16 22    Temp: 98.2 °F (36.8 °C)     SpO2: 98% 94% 93%   Weight: 65.8 kg (145 lb)     Height: 5' 8\" (1.727 m)              Physical Exam   Constitutional: He is oriented to person, place, and time. He appears well-developed and well-nourished. No distress. HENT:   Head: Normocephalic. Large area of ecchymosis to the left forehead and cheek. No lacerations present   Eyes: Conjunctivae are normal. No scleral icterus. Left eye lid contusions. No orbital involvement no hyphemas good extraocular movements. Neck: Normal range of motion. Neck supple. Cardiovascular: Normal heart sounds. Occasional ectopy   Pulmonary/Chest: Effort normal and breath sounds normal. No stridor. No respiratory distress. He has no wheezes. He has no rales. Abdominal: Soft. There is no tenderness. There is no rebound and no guarding. Musculoskeletal: Edema: minimal lower extremity edema. Neurological: He is alert and oriented to person, place, and time. No focal weakness   Skin: Skin is warm and dry. No rash noted. He is not diaphoretic. Psychiatric: He has a normal mood and affect. His behavior is normal.   Nursing note and vitals reviewed. MDM  Number of Diagnoses or Management Options  Acute on chronic congestive heart failure, unspecified congestive heart failure type [facility]):   Contusion of globe of left eye, initial encounter:   Syncope and collapse:   Diagnosis management comments: Patient thankfully has a negative CT scan of the head he has had some progressive increased shortness of breath over the last 4 weeks minimal effusions on his chest x-ray but his BNP is very elevated I have spoken with cardiology for evaluation and have paged  And spoken to by her for interrogation of his defibrillator.        Amount and/or Complexity of Data Reviewed  Clinical lab tests: ordered and reviewed (Results for orders placed or performed during the hospital encounter of 02/22/17  -CBC WITH AUTOMATED DIFF       Result                                            Value                         Ref Range                       WBC                                               6.3                           4.3 - 11.1 K/uL                 RBC                                               3.89 (L)                      4.23 - 5.67 M/uL                HGB                                               13.0 (L)                      13.6 - 17.2 g/dL                HCT                                               38.8 (L)                      41.1 - 50.3 %                   MCV                                               99.7 (H)                      79.6 - 97.8 FL                  MCH                                               33.4 (H)                      26.1 - 32.9 PG                  MCHC                                              33.5                          31.4 - 35.0 g/dL                RDW                                               17.7 (H)                      11.9 - 14.6 %                   PLATELET                                          182                           150 - 450 K/uL                  MPV                                               12.3                          10.8 - 14.1 FL                  DF                                                AUTOMATED                                                     NEUTROPHILS                                       71                            43 - 78 %                       LYMPHOCYTES                                       18                            13 - 44 %                       MONOCYTES                                         10                            4.0 - 12.0 %                    EOSINOPHILS                                       1                             0.5 - 7.8 %                     BASOPHILS                                         0                             0.0 - 2.0 %                     IMMATURE GRANULOCYTES                             0.3                           0.0 - 5.0 %                     ABS. NEUTROPHILS                                  4.5                           1.7 - 8.2 K/UL                  ABS. LYMPHOCYTES                                  1.1                           0.5 - 4.6 K/UL                  ABS. MONOCYTES                                    0.6                           0.1 - 1.3 K/UL                  ABS. EOSINOPHILS                                  0.0                           0.0 - 0.8 K/UL                  ABS. BASOPHILS                                    0.0                           0.0 - 0.2 K/UL                  ABS. IMM.  GRANS.                                  0.0                           0.0 - 0.5 K/UL             -METABOLIC PANEL, COMPREHENSIVE       Result                                            Value                         Ref Range                       Sodium                                            142                           136 - 145 mmol/L                Potassium                                         3.5                           3.5 - 5.1 mmol/L                Chloride                                          103                           98 - 107 mmol/L                 CO2                                               31                            21 - 32 mmol/L                  Anion gap                                         8                             7 - 16 mmol/L                   Glucose                                           113 (H)                       65 - 100 mg/dL                  BUN                                               34 (H)                        8 - 23 MG/DL                    Creatinine                                        1.70 (H)                      0.8 - 1.5 MG/DL                 GFR est AA                                        51 (L)                        >60 ml/min/1.73m2               GFR est non-AA 42 (L)                        >60 ml/min/1.73m2               Calcium                                           9.2                           8.3 - 10.4 MG/DL                Bilirubin, total                                  1.2 (H)                       0.2 - 1.1 MG/DL                 ALT (SGPT)                                        41                            12 - 65 U/L                     AST (SGOT)                                        70 (H)                        15 - 37 U/L                     Alk. phosphatase                                  61                            50 - 136 U/L                    Protein, total                                    7.8                           6.3 - 8.2 g/dL                  Albumin                                           3.2                           3.2 - 4.6 g/dL                  Globulin                                          4.6 (H)                       2.3 - 3.5 g/dL                  A-G Ratio                                         0.7 (L)                       1.2 - 3.5                  -MAGNESIUM       Result                                            Value                         Ref Range                       Magnesium                                         2.2                           1.8 - 2.4 mg/dL            -TROPONIN I       Result                                            Value                         Ref Range                       Troponin-I, Qt.                                   0.05                          0.02 - 0.05 NG/ML         )  Tests in the radiology section of CPT®: ordered and reviewed (Xr Chest Pa Lat    Result Date: 2/22/2017  Chest X-ray INDICATION:  Syncope, dizziness PA and lateral views of the chest were obtained. FINDINGS: There are small bilateral pleural effusions. There is no significant infiltrate. There is stable cardia megaly. Sternotomy changes and pacemaker are present. IMPRESSION: New small bilateral pleural effusions     Ct Head Wo Cont    Result Date: 2/22/2017  Head CT INDICATION:  Syncope, dizziness, left forehead trauma Multiple axial images obtained through the brain without intravenous contrast. Radiation dose reduction techniques were used for this study: All CT scans performed at this facility use one or all of the following: Automated exposure control, adjustment of the mA and/or kVp according to patient's size, iterative reconstruction. FINDINGS: No areas of abnormal attenuation are seen in the brain. There is no CT evidence of acute hemorrhage or infarction. The ventricles are normal in size. There are no extra-axial fluid collections. No masses are seen. The sinuses are clear. There are no bony lesions. There is a left supraorbital scalp hematoma.      IMPRESSION: No CT evidence of acute intracranial abnormality.    )      ED Course       Procedures

## 2017-02-23 NOTE — H&P
[facility] Cardiology History & Physical      Date of  Admission: 2/22/2017  7:11 PM     Primary Care Physician: Dr. Iris Sinclair  Primary Cardiologist: Dr. Azul Lala  Admitting Physician: Dr. Saritha Jimenez    CC: syncope    HPI:  Joel Morgan is a 67 y.o. male with past medical history ischemic cardiomyopathy with BiV ICD in place, CAD, CKD, COPD, DM2, HTN, hyperlipidemia, and paroxysmal VT who presented to the ER with complaints of syncope. Patient states that he went to lie down in his bed earlier this afternoon when he felt dizzy. He attempted to sit up and doesn't remember anything after that. Upon arrival to the ER, EKG showed AV paced rhythm with rate of 70. He denies chest pain. States that he has had some mild shortness of breath of the past several days. Denies significant peripheral edema himself. Device was interrogated while in the ER and showed VT episode today consistent with syncopal episode that lasted approximately 25 seconds (70 beat run) and fell under the treatment zone. He was also found to have an additional episode of VT with rate of 167 on 2/19. Pt VT is a recurrent and worsening problem, no aggravating or alleviating factors, with symptoms as noted above.       Past Medical History:   Diagnosis Date    Acute renal insufficiency 6/12/2011    AICD (automatic cardioverter/defibrillator) present 3/7/2016    1/9/13: Biotronik biventricular ICD, Dr. Kia Bravo 6/13/2011    CAD (coronary artery disease)     CHF, EF 10-15% in 1/13, had CABG surgery in [place] years ago    Cardiomyopathy - EF 10-15% 6/13/2011    Chronic kidney disease, stage III (moderate) 6/12/2011    Baseline Cr 1.6     Chronic obstructive pulmonary disease (COPD) (HCC)     Chronic systolic heart failure (HCC)     Ischemic cardiomyopathy, 1/13 EF 10-15%, mod-severe MR, mod TR, RVSP 48    CKD (chronic kidney disease) 6/12/2011    Congestive heart failure, unspecified ([facility] 6/13/2013    Diabetes mellitus, type 2 ([facility] 6/12/2011    Encounter for therapeutic drug monitoring 6/13/2013    Essential hypertension, benign 6/13/2013    Former heavy cigarette smoker (20-39 per day)     Hyperlipidemia 9/2/2015    Impotence of organic origin 6/13/2013    Paroxysmal ventricular tachycardia (HCC)     Skin lesion of face 9/2/2015    Stroke [facility])     TIA (transient ischemic attack) 6/12/2011    Unspecified hypothyroidism 6/13/2013    Unspecified transient cerebral ischemia 11/11    carotids less than 50% stenosis bilaterally    Unspecified vitamin D deficiency 6/13/2013    Ventricular tachycardia ([address] 3/7/2016    Amiodarone. 2/15/13: TSH4 4.4, T4 14.6 normal LFTs. Labs are done every 3 months by PCP. Sep 2014: TSH 5.9, T4 1.5, LFT's normal Dec 2014: normal thyroid, AST 43, ALT normal       Past Surgical History:   Procedure Laterality Date    HX BIV-IMPLANTABLE CARDIOVERTER DEFIBRILLATOR  1/8/2013    B[employer]ronik BiV ICD    HX CATARACT REMOVAL Left 12/21/2016    [facility]    HX CATARACT REMOVAL Right 12/28/2016    [facility] CORONARY ARTERY BYPASS GRAFT  2013    CABG    HX HERNIA REPAIR  6/08    Inguinal hernia repair-right       Allergies   Allergen Reactions    Spironolactone Unknown (comments)      Social History     Social History    Marital status: SINGLE     Spouse name: N/A    Number of children: N/A    Years of education: N/A     Occupational History    Not on file.      Social History Main Topics    Smoking status: Former Smoker     Packs/day: 1.00     Years: 40.00     Quit date: 1/1/2000    Smokeless tobacco: Never Used    Alcohol use Yes      Comment: rare    Drug use: No    Sexual activity: Not on file     Other Topics Concern    Not on file     Social History Narrative     Family History   Problem Relation Age of Onset    Coronary Artery Disease Father      MI at 57 yo    Heart Disease Father 58     MI    Cancer Brother      benign brain tumor; resected approx 2007    Other Neg Hx      prostate cancer        No current facility-administered medications for this encounter. Current Outpatient Prescriptions   Medication Sig    furosemide (LASIX) 20 mg tablet Take 20 mg by mouth every evening.  carvedilol (COREG) 3.125 mg tablet Take 1 Tab by mouth two (2) times daily (with meals).  fenofibrate (LOFIBRA) 160 mg tablet TAKE 1 TABLET EVERY DAY    warfarin (COUMADIN) 2.5 mg tablet Take 2.5 mg by mouth daily.  levothyroxine (SYNTHROID) 75 mcg tablet Take 75 mcg by mouth Daily (before breakfast).  digoxin (LANOXIN) 0.125 mg tablet TAKE 1/2 TABLET EVERY DAY    magnesium oxide (MAG-OX) 400 mg tablet Take 1 Tab by mouth daily.  besifloxacin (BESIVANCE) 0.6 % drps ophthalmic suspension 1 Drop three (3) times daily.  atorvastatin (LIPITOR) 40 mg tablet Take 40 mg by mouth daily.  amiodarone (CORDARONE) 200 mg tablet TAKE 1 TABLET EVERY DAY    aspirin delayed-release 81 mg tablet Take  by mouth daily.  flaxseed oil 1,000 mg cap Take 1 Tab by mouth as needed. Review of Systems    Review of Systems   Respiratory: Positive for shortness of breath. Cardiovascular: Positive for leg swelling. Neurological: Positive for dizziness and loss of consciousness. All other systems reviewed and are negative. Subjective:     Visit Vitals    /68    Pulse 68    Temp 98.2 °F (36.8 °C)    Resp 19    Ht 5' 8\" (1.727 m)    Wt 65.8 kg (145 lb)    SpO2 97%    BMI 22.05 kg/m2     Physical Exam   Constitutional: He is oriented to person, place, and time and well-developed, well-nourished, and in no distress. Eyes: Pupils are equal, round, and reactive to light. Neck: Normal range of motion. Neck supple. Cardiovascular: Normal rate and regular rhythm. Pulmonary/Chest: Effort normal and breath sounds normal.   Abdominal: Soft. Musculoskeletal: He exhibits edema.    Neurological: He is alert and oriented to person, place, and time. Skin: Skin is warm and dry. Psychiatric: Affect normal.       Cardiographics  Telemetry: normal sinus rhythm, AV paced  ECG: AV paced  Echocardiogram: recently done in the office on 1/10/17  * EF 10-15%, only mid-anterior has cardiac function  * mild to moderate mitral regurgitation  * mild elevated of estimated PA systolic pressure    Labs:   Recent Results (from the past 24 hour(s))   EKG, 12 LEAD, INITIAL    Collection Time: 02/22/17  5:56 PM   Result Value Ref Range    Systolic BP  mmHg    Diastolic BP  mmHg    Ventricular Rate 70 BPM    Atrial Rate 70 BPM    P-R Interval 192 ms    QRS Duration 178 ms    Q-T Interval 478 ms    QTC Calculation (Bezet) 516 ms    Calculated P Axis  degrees    Calculated R Axis -116 degrees    Calculated T Axis 79 degrees    Diagnosis       Statement Not Found (#171) with occasional Statement Not Found (#914)  Statement Not Found (#289)  Abnormal ECG  When compared with ECG of 08-JAN-2013 10:08,  Current undetermined rhythm precludes rhythm comparison, needs review     CBC WITH AUTOMATED DIFF    Collection Time: 02/22/17  6:05 PM   Result Value Ref Range    WBC 6.3 4.3 - 11.1 K/uL    RBC 3.89 (L) 4.23 - 5.67 M/uL    HGB 13.0 (L) 13.6 - 17.2 g/dL    HCT 38.8 (L) 41.1 - 50.3 %    MCV 99.7 (H) 79.6 - 97.8 FL    MCH 33.4 (H) 26.1 - 32.9 PG    MCHC 33.5 31.4 - 35.0 g/dL    RDW 17.7 (H) 11.9 - 14.6 %    PLATELET 290 030 - 994 K/uL    MPV 12.3 10.8 - 14.1 FL    DF AUTOMATED      NEUTROPHILS 71 43 - 78 %    LYMPHOCYTES 18 13 - 44 %    MONOCYTES 10 4.0 - 12.0 %    EOSINOPHILS 1 0.5 - 7.8 %    BASOPHILS 0 0.0 - 2.0 %    IMMATURE GRANULOCYTES 0.3 0.0 - 5.0 %    ABS. NEUTROPHILS 4.5 1.7 - 8.2 K/UL    ABS. LYMPHOCYTES 1.1 0.5 - 4.6 K/UL    ABS. MONOCYTES 0.6 0.1 - 1.3 K/UL    ABS. EOSINOPHILS 0.0 0.0 - 0.8 K/UL    ABS. BASOPHILS 0.0 0.0 - 0.2 K/UL    ABS. IMM.  GRANS. 0.0 0.0 - 0.5 K/UL   METABOLIC PANEL, COMPREHENSIVE    Collection Time: 02/22/17  6:05 PM   Result Value Ref Range    Sodium 142 136 - 145 mmol/L    Potassium 3.5 3.5 - 5.1 mmol/L    Chloride 103 98 - 107 mmol/L    CO2 31 21 - 32 mmol/L    Anion gap 8 7 - 16 mmol/L    Glucose 113 (H) 65 - 100 mg/dL    BUN 34 (H) 8 - 23 MG/DL    Creatinine 1.70 (H) 0.8 - 1.5 MG/DL    GFR est AA 51 (L) >60 ml/min/1.73m2    GFR est non-AA 42 (L) >60 ml/min/1.73m2    Calcium 9.2 8.3 - 10.4 MG/DL    Bilirubin, total 1.2 (H) 0.2 - 1.1 MG/DL    ALT (SGPT) 41 12 - 65 U/L    AST (SGOT) 70 (H) 15 - 37 U/L    Alk. phosphatase 61 50 - 136 U/L    Protein, total 7.8 6.3 - 8.2 g/dL    Albumin 3.2 3.2 - 4.6 g/dL    Globulin 4.6 (H) 2.3 - 3.5 g/dL    A-G Ratio 0.7 (L) 1.2 - 3.5     MAGNESIUM    Collection Time: 02/22/17  6:05 PM   Result Value Ref Range    Magnesium 2.2 1.8 - 2.4 mg/dL   TROPONIN I    Collection Time: 02/22/17  6:05 PM   Result Value Ref Range    Troponin-I, Qt. 0.05 0.02 - 0.05 NG/ML   BNP    Collection Time: 02/22/17  6:43 PM   Result Value Ref Range    BNP 3719 pg/mL   PROTHROMBIN TIME + INR    Collection Time: 02/22/17  8:08 PM   Result Value Ref Range    Prothrombin time 28.1 (H) 9.6 - 12.0 sec    INR 2.6 (H) 0.9 - 1.2         Patient has been seen and examined by Dr. Reta Bowers and he agrees with the following assessment and plan:     Assessment/Plan:       Syncope -- related to VT episode earlier today. On amiodarone 200mg daily as outpatient. Will increase to 400mg BID for now. Admit to telemetry overnight. Ventricular tachycardia -- device interrogated in the ER. VT treatment zone decreased to 158. Chronic systolic heart failure -- progressive and endstage. EF 10-15% with only mid-anterior cardiac function. ACEI recently stopped due to persistent hypotension. Dr. Trae Cosme has discussed palliative care/hospice with patient, however, he has not been receptive to this idea in the past.       Dyslipidemia -- on lipitor 40mg daily as outpatient. Hypothyroidism --TSH 7.18 on 1/30/17. Will recheck TSH today.  Currently on 75mcg Synthroid daily as outpatient. Todd Chappell NP  2/22/2017 10:08 PM    Attending Addendum    Patient independently seen and examined by me. Agree with above note by physician extender with the following additions and exceptions: 77yo with end stage cardiomyopathy with a history of ventricular tachycardia on amiodarone, ICD in place presenting with syncope and device interrogation revealing sustained VT under his VT-1 detection zone. Key findings are:  No CP or chronic shortness of breath  CV- RRR, distant S1, S2, +SM  Lungs- Clear bilaterally but decreased breath sounds  Ext- 1+ ZOILA    Plan:       --device reprogrammed, VT-1 zone decreased       --will increase amiodarone to 400mg Q12H       --need to continue end of life discussions/hospice       --cautious diuresis       --further plan as above    Mirna COLVIN  [address] [facility]

## 2017-02-24 NOTE — PROGRESS NOTES
Notified by other RN of patient having Jurgen Jo. Upon entering room, patient awake laying in bed with family at bedside. Paulo Fox, NP at bedside speaking with patient and family. Received orders for IV amiodarone drip and amiodarone bolus. Will continue to monitor pt.

## 2017-02-24 NOTE — PROGRESS NOTES
Pt had amio gtt running in left arm. Upon assessing IV, site noted to be swollen. IV taken out. Ice pack applied. LUE elevated. New IV placed on opposite arm. Will monitor.

## 2017-02-24 NOTE — PROGRESS NOTES
Discussed with Patient about End of Life issues and his ICD - Per Patient request, will turn off ICD function.

## 2017-02-24 NOTE — PROGRESS NOTES
[facility] CARDIOLOGY PROGRESS NOTE           2/24/2017 9:42 AM    Admit Date: 2/22/2017    Admit Diagnosis: Ventricular tachycardia (HCC)      Subjective:   Patient with ongoing V. Tach, and hypotension    Objective:     Vitals:    02/24/17 0116 02/24/17 0636 02/24/17 0745 02/24/17 0918   BP: (!) 87/59 (!) 87/52 (!) 85/57 (!) 88/60   Pulse: 69 67 69    Resp: 18 18 18    Temp: 97.4 °F (36.3 °C) 97.5 °F (36.4 °C) 97.6 °F (36.4 °C)    SpO2: 96% 100% 97%    Weight:  150 lb 14.4 oz (68.4 kg)     Height:           Physical Exam:  General-Well Developed, Well Nourished, No Acute Distress, Alert & Oriented x 3, appropriate mood. Neck- supple, no JVD  CV- regular rate and rhythm no MRG  Lung- clear bilaterally  Abd- soft, nontender, nondistended  Ext- no edema bilaterally.   Skin- warm and dry    Current Facility-Administered Medications   Medication Dose Route Frequency    amiodarone (CORDARONE) 450 mg in dextrose 5% 250 mL infusion  0.5-1 mg/min IntraVENous TITRATE    aspirin delayed-release tablet 81 mg  81 mg Oral DAILY    atorvastatin (LIPITOR) tablet 40 mg  40 mg Oral DAILY    carvedilol (COREG) tablet 3.125 mg  3.125 mg Oral BID WITH MEALS    fenofibrate (LOFIBRA) tablet 160 mg  160 mg Oral DAILY    levothyroxine (SYNTHROID) tablet 75 mcg  75 mcg Oral ACB    warfarin (COUMADIN) tablet 2.5 mg  2.5 mg Oral Once per day on Sun Tue Thu Sat    magnesium oxide (MAG-OX) tablet 400 mg  400 mg Oral DAILY    sodium chloride (NS) flush 5-10 mL  5-10 mL IntraVENous Q8H    sodium chloride (NS) flush 5-10 mL  5-10 mL IntraVENous PRN    nitroglycerin (NITROSTAT) tablet 0.4 mg  0.4 mg SubLINGual Q5MIN PRN    morphine injection 2 mg  2 mg IntraVENous Q4H PRN    acetaminophen (TYLENOL) tablet 650 mg  650 mg Oral Q4H PRN    ondansetron (ZOFRAN) injection 4 mg  4 mg IntraVENous Q4H PRN    warfarin (COUMADIN) tablet 1.25 mg  1.25 mg Oral Once per day on Mon Wed Fri     Data Review:   Recent Results (from the past 24 hour(s))   METABOLIC PANEL, BASIC    Collection Time: 02/24/17  3:10 AM   Result Value Ref Range    Sodium 141 136 - 145 mmol/L    Potassium 5.3 (H) 3.5 - 5.1 mmol/L    Chloride 105 98 - 107 mmol/L    CO2 29 21 - 32 mmol/L    Anion gap 7 7 - 16 mmol/L    Glucose 135 (H) 65 - 100 mg/dL    BUN 37 (H) 8 - 23 MG/DL    Creatinine 1.74 (H) 0.8 - 1.5 MG/DL    GFR est AA 49 (L) >60 ml/min/1.73m2    GFR est non-AA 41 (L) >60 ml/min/1.73m2    Calcium 8.7 8.3 - 10.4 MG/DL   MAGNESIUM    Collection Time: 02/24/17  3:10 AM   Result Value Ref Range    Magnesium 2.3 1.8 - 2.4 mg/dL     Assessment:     Principal Problem:    Ventricular tachycardia ([facility] (3/7/2016)      Overview: Amiodarone. Active Problems:    Type 2 diabetes mellitus with diabetic chronic kidney disease ([facility] (6/12/2011)      Chronic kidney disease, stage III (moderate) (6/12/2011)      Overview: Baseline Cr 1.6      Chronic systolic heart failure ([facility] (6/13/2011)      Overview: Ischemic cardiomyopathy, EF 22% by MUGA 10/08, had repeatedly declined       device therapy, but agreed in Jan 2013 Remote CABG in [place]. Intolerant of aldosterone blockade due to hyperkalemia. Unable to titrate       beta blocker and ACEi due to hypotension. S ER 12/15/12: IV lasix for dyspnea with CHF exacerbation, BNP 3030, CXR       remarkably clear. 1/3/13: Severe 4 chamber dilatation, EF 10-15%, moderate to severe MR,       moderate TR, RVSP 48, probable LV thrombus (anticoagulated)      Jan 2013: Severe LV Dilatation, EF 10-15%, restrictivve diastolic       fillling, RVSP 48      Sep 2013: Echo - severely dilated LV.  LVEF 10-15%, mild MR and TR, RVSP 25      Sep 2014: Echo - LVEF estimated 5-10% (calculated 22% felt to be       optimistic), moderate MR and TR, RVSP 32, thinning and akinesis of the       inferior wall and apex      Oct 2015: EF 13%, mild MR and TR, RVSP 27 Jan 2017 - severe LVE, EF 10-15%, only the mid anterior wall moves, mild       to mod MR, RVSP 40      Acquired hypothyroidism (1/30/2017)      Mixed hyperlipidemia (1/30/2017)      Plan:   1. V. Tach - Recurrent secondary to end CHF. Change iv amio to po  2. End Chronic systolic heart failure (EF <10%) - Discussed end of life issues. Hospice has been consulted. Low dose lasix. 3. End of Life issues - Await Hospice final plans. Consider stopping ICD therapies at discharge. Will limits meds. Bud Mortimer. Ronnie Felipe M.D., F.A.C.C, F.H.R.S.   Cardiology/Electrophysiology

## 2017-02-24 NOTE — PROGRESS NOTES
Called to bedside by nursing staff, patient noted to be in [facility] with rate from 126-145. VT treatment zone changed in the ER on admission last night to 158. Amiodarone increased to 400mg BID on admission. Family at bedside wanted patient to be shocked despite DNR order since patient has an ICD in place. Prior to placing pads on patient, patient returned to a paced rhythm with rate. Discussed patient's situation with family in the hallway. Daughter kept saying over and over \" I don't understand why this is happening\". Patient was seen by hospice earlier today, no definitive plan documented at this time for discharge. Will start IV amiodarone drip with 150mg IV bolus overnight.      Elisabeth Fortune NP  2/23/17  10:51 PM

## 2017-02-24 NOTE — PROGRESS NOTES
Problem: Falls - Risk of  Goal: *Absence of falls  Outcome: Progressing Towards Goal  Patient agrees to use call light to call for assistance when ambulating, or ask a family member for assistance to prevent falls. Call light in place.

## 2017-02-24 NOTE — PROGRESS NOTES
[facility] dc screening. Adm with VTach and end stage Afib. Resides with daughter, Carlos Newman ([phone]) and her spouse. Pt was ambulating independently, for the most part, PTA. Indep with ADLs. Home DME: hospital bed  Sonja or her spouse are available to assist during the day. Hospice was consulted by Cardiology. Liaison met with pt yesterday. Sonja/pt are in agreement with home hospice. Hospice liaison will meet with pt later this morning and finalize plans. ADDENDUM:  Pt will dc home in the morning with [facility]. ICD will be turned off this aftn. Ambulance transport has been arranged for tomorrow through [employer]. Pt is on \"will call\" status.

## 2017-02-24 NOTE — PROGRESS NOTES
Bedside shift change report given to Jyoti Grant RN. Report included the following information SBAR, Kardex, MAR and Recent Results.

## 2017-02-24 NOTE — PROGRESS NOTES
Bedside shift change report received from [address]. Report included the following information SBAR, Kardex, MAR and Recent Results. Amio gtt rate verified.

## 2017-02-24 NOTE — PROGRESS NOTES
Bedside and Verbal shift change report given to self (oncoming nurse) by Tamiko Bustamante RN (offgoing nurse). Report included the following information SBAR, Kardex, MAR and Recent Results.

## 2017-02-24 NOTE — HOSPICE
Call placed to [facility] by daughter to request in home hospice setup. Case discussed with Dr. Martina Bethea and patient accepted for routine in home hospice care. DME of oxygen, WC, walker, BSC and over bed table ordered to home today. Patient and family on board with hospice philosophy and comfort care measures.  Patient to transport home tomorrow at 10am.  Thank you for this referral.  Ursula Charles RN  [facility] Nurse Liaison   [facility]  [phone]

## 2017-02-25 NOTE — PROGRESS NOTES
Patient had a short period where pacer appeared to be pacing on the QRS. Patient asymptomatic. Strips placed on chart. Patient now pacing regularly.

## 2017-02-25 NOTE — PROGRESS NOTES
Bedside shift change report received from [address]. Report included the following information SBAR, Kardex, MAR and Recent Results.

## 2017-02-25 NOTE — PROGRESS NOTES
Bedside shift change report given to Daniel Goddard RN. Report included the following information SBAR, Kardex, MAR and Recent Results.

## 2017-02-25 NOTE — DISCHARGE INSTRUCTIONS
DISCHARGE SUMMARY from Nurse    The following personal items are in your possession at time of discharge:    Dental Appliances: Uppers, With patient, Lowers  Visual Aid: Glasses, With patient     Home Medications: None  Jewelry: With patient, Watch  Clothing: Footwear, Undergarments, With patient, Shirt, Pants  Other Valuables: Eyeglasses, With patient             PATIENT INSTRUCTIONS:    After general anesthesia or intravenous sedation, for 24 hours or while taking prescription Narcotics:  · Limit your activities  · Do not drive and operate hazardous machinery  · Do not make important personal or business decisions  · Do  not drink alcoholic beverages  · If you have not urinated within 8 hours after discharge, please contact your surgeon on call. Report the following to your surgeon:  · Excessive pain, swelling, redness or odor of or around the surgical area  · Temperature over 100.5  · Nausea and vomiting lasting longer than 4 hours or if unable to take medications  · Any signs of decreased circulation or nerve impairment to extremity: change in color, persistent  numbness, tingling, coldness or increase pain  · Any questions        What to do at Home:  Recommended activity: Activity as tolerated    If you experience any of the following symptoms chest pain, shortness of breath, please follow up with [facility] Cardiology. *  Please give a list of your current medications to your Primary Care Provider. *  Please update this list whenever your medications are discontinued, doses are      changed, or new medications (including over-the-counter products) are added. *  Please carry medication information at all times in case of emergency situations. These are general instructions for a healthy lifestyle:    No smoking/ No tobacco products/ Avoid exposure to second hand smoke    Surgeon General's Warning:  Quitting smoking now greatly reduces serious risk to your health.     Obesity, smoking, and sedentary lifestyle greatly increases your risk for illness    A healthy diet, regular physical exercise & weight monitoring are important for maintaining a healthy lifestyle    You may be retaining fluid if you have a history of heart failure or if you experience any of the following symptoms:  Weight gain of 3 pounds or more overnight or 5 pounds in a week, increased swelling in our hands or feet or shortness of breath while lying flat in bed. Please call your doctor as soon as you notice any of these symptoms; do not wait until your next office visit. Recognize signs and symptoms of STROKE:    F-face looks uneven    A-arms unable to move or move unevenly    S-speech slurred or non-existent    T-time-call 911 as soon as signs and symptoms begin-DO NOT go       Back to bed or wait to see if you get better-TIME IS BRAIN. Warning Signs of HEART ATTACK     Call 911 if you have these symptoms:   Chest discomfort. Most heart attacks involve discomfort in the center of the chest that lasts more than a few minutes, or that goes away and comes back. It can feel like uncomfortable pressure, squeezing, fullness, or pain.  Discomfort in other areas of the upper body. Symptoms can include pain or discomfort in one or both arms, the back, neck, jaw, or stomach.  Shortness of breath with or without chest discomfort.  Other signs may include breaking out in a cold sweat, nausea, or lightheadedness. Don't wait more than five minutes to call 911 - MINUTES MATTER! Fast action can save your life. Calling 911 is almost always the fastest way to get lifesaving treatment. Emergency Medical Services staff can begin treatment when they arrive -- up to an hour sooner than if someone gets to the hospital by car. The discharge information has been reviewed with the patient. The patient verbalized understanding.     Discharge medications reviewed with the patient and appropriate educational materials and side effects teaching were provided. Heart Failure: Care Instructions  Your Care Instructions    Heart failure occurs when your heart does not pump as much blood as the body needs. Failure does not mean that the heart has stopped pumping but rather that it is not pumping as well as it should. Over time, this causes fluid buildup in your lungs and other parts of your body. Fluid buildup can cause shortness of breath, fatigue, swollen ankles, and other problems. By taking medicines regularly, reducing sodium (salt) in your diet, checking your weight every day, and making lifestyle changes, you can feel better and live longer. Follow-up care is a key part of your treatment and safety. Be sure to make and go to all appointments, and call your doctor if you are having problems. It's also a good idea to know your test results and keep a list of the medicines you take. How can you care for yourself at home? Medicines  · Be safe with medicines. Take your medicines exactly as prescribed. Call your doctor if you think you are having a problem with your medicine. · Do not take any vitamins, over-the-counter medicine, or herbal products without talking to your doctor first. Zetta Hipps not take ibuprofen (Advil or Motrin) and naproxen (Aleve) without talking to your doctor first. They could make your heart failure worse. · You may be taking some of the following medicine. ¨ Beta-blockers can slow heart rate, decrease blood pressure, and improve your condition. Taking a beta-blocker may lower your chance of needing to be hospitalized. ¨ Angiotensin-converting enzyme inhibitors (ACEIs) reduce the heart's workload, lower blood pressure, and reduce swelling. Taking an ACEI may lower your chance of needing to be hospitalized again. ¨ Angiotensin II receptor blockers (ARBs) work like ACEIs. Your doctor may prescribe them instead of ACEIs. ¨ Diuretics, also called water pills, reduce swelling.   ¨ Potassium supplements replace this important mineral, which is sometimes lost with diuretics. ¨ Aspirin and other blood thinners prevent blood clots, which can cause a stroke or heart attack. You will get more details on the specific medicines your doctor prescribes. Diet  · Your doctor may suggest that you limit sodium to 2,000 milligrams (mg) a day or less. That is less than 1 teaspoon of salt a day, including all the salt you eat in cooking or in packaged foods. People get most of their sodium from processed foods. Fast food and restaurant meals also tend to be very high in sodium. · Ask your doctor how much liquid you can drink each day. You may have to limit liquids. Weight  · Weigh yourself without clothing at the same time each day. Record your weight. Call your doctor if you gain more than 3 pounds in 2 to 3 days. A sudden weight gain may mean that your heart failure is getting worse. Activity level  · Start light exercise (if your doctor says it is okay). Even if you can only do a small amount, exercise will help you get stronger, have more energy, and manage your weight and your stress. Walking is an easy way to get exercise. Start out by walking a little more than you did before. Bit by bit, increase the amount you walk. · When you exercise, watch for signs that your heart is working too hard. You are pushing yourself too hard if you cannot talk while you are exercising. If you become short of breath or dizzy or have chest pain, stop, sit down, and rest.  · If you feel \"wiped out\" the day after you exercise, walk slower or for a shorter distance until you can work up to a better pace. · Get enough rest at night. Sleeping with 1 or 2 pillows under your upper body and head may help you breathe easier. Lifestyle changes  · Do not smoke. Smoking can make a heart condition worse. If you need help quitting, talk to your doctor about stop-smoking programs and medicines. These can increase your chances of quitting for good.  Quitting smoking may be the most important step you can take to protect your heart. · Limit alcohol to 2 drinks a day for men and 1 drink a day for women. Too much alcohol can cause health problems. · Avoid getting sick from colds and the flu. Get a pneumococcal vaccine shot. If you have had one before, ask your doctor whether you need another dose. Get a flu shot each year. If you must be around people with colds or the flu, wash your hands often. When should you call for help? Call 911 if you have symptoms of sudden heart failure such as:  · You have severe trouble breathing. · You cough up pink, foamy mucus. · You have a new irregular or rapid heartbeat. Call your doctor now or seek immediate medical care if:  · You have new or increased shortness of breath. · You are dizzy or lightheaded, or you feel like you may faint. · You have sudden weight gain, such as 3 pounds or more in 2 to 3 days. · You have increased swelling in your legs, ankles, or feet. · You are suddenly so tired or weak that you cannot do your usual activities. Watch closely for changes in your health, and be sure to contact your doctor if:  · You develop new symptoms. Where can you learn more? Go to http://nikolas-my.info/. Enter T163 in the search box to learn more about \"Heart Failure: Care Instructions. \"  Current as of: January 27, 2016  Content Version: 11.1  © 8830-9639 [employer]. Care instructions adapted under license by [employer] (which disclaims liability or warranty for this information). If you have questions about a medical condition or this instruction, always ask your healthcare professional. [address] any warranty or liability for your use of this information.

## 2017-02-25 NOTE — PROGRESS NOTES
[facility] CARDIOLOGY PROGRESS NOTE           2/25/2017 10:19 AM    Admit Date: 2/22/2017      Subjective:   Plan for home with hospice today. ICD turned off. No complaints    ROS:  Cardiovascular:  As noted above    Objective:      Vitals:    02/25/17 0325 02/25/17 0606 02/25/17 0800 02/25/17 0901   BP:  99/63 98/68 104/65   Pulse:  71 71 69   Resp:  18 18    Temp:  98.4 °F (36.9 °C) 98 °F (36.7 °C)    SpO2:  95% 97%    Weight: 68.6 kg (151 lb 3.2 oz)      Height:           Physical Exam:  General-No Acute Distress  Neck- supple, no JVD  CV- regular rate and rhythm no MRG  Lung- clear bilaterally  Abd- soft, nontender, nondistended  Ext- no edema bilaterally. Skin- warm and dry    Data Review:   Recent Labs      02/24/17   0310  02/23/17   0430  02/23/17   0021  02/22/17 2008 02/22/17   1805   NA  141   --    --    --   142   K  5.3*   --    --    --   3.5   MG  2.3   --    --    --   2.2   BUN  37*   --    --    --   34*   CREA  1.74*   --    --    --   1.70*   GLU  135*   --    --    --   113*   WBC   --    --    --    --   6.3   HGB   --    --    --    --   13.0*   HCT   --    --    --    --   38.8*   PLT   --    --    --    --   182   INR   --    --    --   2.6*   --    TROIQ   --    --   0.05   --   0.05   CHOL   --   117   --    --    --    TGL   --   103   --    --    --    LDLC   --   67.4   --    --    --    HDL   --   29*   --    --    --        Assessment/Plan:     Principal Problem:    Ventricular tachycardia (HCC) (3/7/2016)      Overview: Amiodarone. Active Problems:    Type 2 diabetes mellitus with diabetic chronic kidney disease ([facility] (6/12/2011)      Chronic kidney disease, stage III (moderate) (6/12/2011)      Overview: Baseline Cr 1.6      Chronic systolic heart failure ([address] (6/13/2011)      Overview: Ischemic cardiomyopathy, EF 22% by MUGA 10/08, had repeatedly declined       device therapy, but agreed in Jan 2013 Remote CABG in [place].       Intolerant of aldosterone blockade due to hyperkalemia. Unable to titrate       beta blocker and ACEi due to hypotension. S ER 12/15/12: IV lasix for dyspnea with CHF exacerbation, BNP 3030, CXR       remarkably clear. 1/3/13: Severe 4 chamber dilatation, EF 10-15%, moderate to severe MR,       moderate TR, RVSP 48, probable LV thrombus (anticoagulated)      Jan 2013: Severe LV Dilatation, EF 10-15%, restrictivve diastolic       fillling, RVSP 48      Sep 2013: Echo - severely dilated LV. LVEF 10-15%, mild MR and TR, RVSP 25      Sep 2014: Echo - LVEF estimated 5-10% (calculated 22% felt to be       optimistic), moderate MR and TR, RVSP 32, thinning and akinesis of the       inferior wall and apex      Oct 2015: EF 13%, mild MR and TR, RVSP 27      Jan 2017 - severe LVE, EF 10-15%, only the mid anterior wall moves, mild       to mod MR, RVSP 40      Acquired hypothyroidism (1/30/2017)      Mixed hyperlipidemia (1/30/2017)      End stage HF; home with hospice today. ICD turned off per pt wishes.  Discussed with pt and family    Mitch Salazar MD  2/25/2017 10:19 AM

## 2017-02-25 NOTE — DISCHARGE SUMMARY
[facility] Cardiology Discharge Summary     Patient ID:  Claudell Rater  [id number]  23 y.o.  1940    Admit date: 2/22/2017    Discharge date:  2/25/2017    Admitting Physician: Jj Victor MD     Discharge Physician: JUAN J Lynn/Dr. Nancy Conti    Admission Diagnoses: Ventricular tachycardia [facility])    Discharge Diagnoses:   Patient Active Problem List    Diagnosis Date Noted    Former heavy cigarette smoker (20-39 per day)     Acquired hypothyroidism 01/30/2017    Mixed hyperlipidemia 01/30/2017    Ventricular tachycardia ([address] 03/07/2016    AICD (automatic cardioverter/defibrillator) present 03/07/2016    Hyperlipidemia 09/02/2015    Skin lesion of face 09/02/2015    Other and unspecified hyperlipidemia 06/13/2013    Congestive heart failure, unspecified 06/13/2013    Vitamin D deficiency 06/13/2013    Encounter for therapeutic drug monitoring 06/13/2013    Impotence of organic origin 06/13/2013    Cardiomyopathy - EF 10-15% 06/13/2011    Bigeminy 06/13/2011    Chronic systolic heart failure ([address] 06/13/2011    TIA (transient ischemic attack) 06/12/2011    Type 2 diabetes mellitus with diabetic chronic kidney disease ([address] 06/12/2011    Chronic kidney disease, stage III (moderate) 06/12/2011       Cardiology Procedures this admission:  None  Consults: [facility] Course: Patient presented to the emergency department of [facility] after syncopal episode. Patient stated that he went to lie down in his bed and began feeling dizzy. He attempted to sit up and doesn't remember anything after that. Upon arrival to the ER, EKG showed AV paced rhythm with rate of 70. He denied any chest pain. He had noted some mild shortness of breath over the past several days. His ICD was interrogated which showed VT episode consistent with his syncopal episode that lasted approximately 25 seconds (70 beat run) and fell under the treatment zone.  He was also found to have an additional episode of VT with rate of 167 on 2/19. He was admitted. His oral amiodarone was increased. He continued to have VT. He was started on IV amiodarone. Hospice was consulted given recurrent VT. The patient and family decided to go home with hospice care. He was transitioned to oral amiodarone. ICD function was turned off. The morning of 2/25/2017 patient was up feeling well without any complaints of chest pain or shortness of breath. He was stable and ready for discharge home with home hospice. DISPOSITION: The patient is being discharged home with hospice care in stable condition. Discharge Exam:   Visit Vitals    /65    Pulse 69    Temp 98 °F (36.7 °C)    Resp 18    Ht 5' 8\" (1.727 m)    Wt 68.6 kg (151 lb 3.2 oz)    SpO2 97%    BMI 22.99 kg/m2      Physical Exam:  General: Well Developed, Well Nourished, No Acute Distress, Alert & Oriented  Neck: supple, no JVD  Heart: S1S2 with RRR  Lungs: Clear throughout auscultation bilaterally  Abd: soft, nontender, nondistended, with good bowel sounds  Ext: warm, no edema, calves supple/nontender, pulses 2+ bilaterally  Skin: warm and dry, contusions noted on face    Patient Instructions:   Current Discharge Medication List      CONTINUE these medications which have CHANGED    Details   amiodarone (PACERONE) 400 mg tablet Take 1 Tab by mouth two (2) times a day for 7 days. Qty: 14 Tab, Refills: 0      amiodarone (CORDARONE) 200 mg tablet Take 1 Tab by mouth two (2) times a day. Qty: 60 Tab, Refills: 1 START 3/5/17            CONTINUE these medications which have NOT CHANGED    Details   levothyroxine (SYNTHROID) 75 mcg tablet Take 75 mcg by mouth Daily (before breakfast). furosemide (LASIX) 20 mg tablet Take 20 mg by mouth every evening.      magnesium oxide (MAG-OX) 400 mg tablet Take 1 Tab by mouth daily.   Qty: 90 Tab, Refills: 1    Associated Diagnoses: Chronic systolic heart failure (HCC)      aspirin delayed-release 81 mg tablet Take by mouth daily. Associated Diagnoses: Other and unspecified hyperlipidemia      warfarin (COUMADIN) 2.5 mg tablet Take 2.5 mg by mouth daily. besifloxacin (BESIVANCE) 0.6 % drps ophthalmic suspension 1 Drop three (3) times daily.          STOP taking these medications       digoxin (LANOXIN) 0.125 mg tablet Comments:   Reason for Stopping:         carvedilol (COREG) 3.125 mg tablet Comments:   Reason for Stopping:         fenofibrate (LOFIBRA) 160 mg tablet Comments:   Reason for Stopping:         atorvastatin (LIPITOR) 40 mg tablet Comments:   Reason for Stopping:         flaxseed oil 1,000 mg cap Comments:   Reason for Stopping:                 Signed:  Ese Moran PA-C  2/25/2017  9:55 AM

## 2017-02-26 NOTE — PROGRESS NOTES
Care Management Interventions  Mode of Transport at Discharge: Other (see comment) Rhett Tutu with DNR)  Transition of Care Consult (CM Consult): Home Hospice ([id number] )  [facility]: Yes  Discharge Durable Medical Equipment:  (as arranged by hospice)  Confirm Follow Up Transport: Family  Plan discussed with Pt/Family/Caregiver: Yes  Freedom of Choice Offered: Yes  Discharge Location  Discharge Placement: Home with hospice (Pt D/C home via [facility] with [address] staff Ben Zuleta alerted to transport time.   Family at bedside at time of transport.)

## 2017-03-13 VITALS — RESPIRATION RATE: 22 BRPM | HEART RATE: 80 BPM | SYSTOLIC BLOOD PRESSURE: 86 MMHG

## 2017-03-13 VITALS
DIASTOLIC BLOOD PRESSURE: 62 MMHG | TEMPERATURE: 97.9 F | SYSTOLIC BLOOD PRESSURE: 98 MMHG | RESPIRATION RATE: 20 BRPM | HEART RATE: 76 BPM
